# Patient Record
Sex: FEMALE | Race: WHITE | NOT HISPANIC OR LATINO | Employment: FULL TIME | URBAN - METROPOLITAN AREA
[De-identification: names, ages, dates, MRNs, and addresses within clinical notes are randomized per-mention and may not be internally consistent; named-entity substitution may affect disease eponyms.]

---

## 2023-08-09 LAB
ALANINE AMINOTRANSFERASE (SGPT) (U/L) IN SER/PLAS: 10 U/L (ref 7–45)
ALBUMIN (G/DL) IN SER/PLAS: 4.7 G/DL (ref 3.4–5)
ALKALINE PHOSPHATASE (U/L) IN SER/PLAS: 72 U/L (ref 33–110)
ANION GAP IN SER/PLAS: 14 MMOL/L (ref 10–20)
ASPARTATE AMINOTRANSFERASE (SGOT) (U/L) IN SER/PLAS: 15 U/L (ref 9–39)
BASOPHILS (10*3/UL) IN BLOOD BY AUTOMATED COUNT: 0.05 X10E9/L (ref 0–0.1)
BASOPHILS/100 LEUKOCYTES IN BLOOD BY AUTOMATED COUNT: 1 % (ref 0–2)
BILIRUBIN TOTAL (MG/DL) IN SER/PLAS: 0.5 MG/DL (ref 0–1.2)
CALCIDIOL (25 OH VITAMIN D3) (NG/ML) IN SER/PLAS: 74 NG/ML
CALCIUM (MG/DL) IN SER/PLAS: 10.2 MG/DL (ref 8.6–10.6)
CARBON DIOXIDE, TOTAL (MMOL/L) IN SER/PLAS: 29 MMOL/L (ref 21–32)
CHLORIDE (MMOL/L) IN SER/PLAS: 104 MMOL/L (ref 98–107)
CHOLESTEROL (MG/DL) IN SER/PLAS: 228 MG/DL (ref 0–199)
CHOLESTEROL IN HDL (MG/DL) IN SER/PLAS: 73.8 MG/DL
CHOLESTEROL/HDL RATIO: 3.1
CREATININE (MG/DL) IN SER/PLAS: 0.84 MG/DL (ref 0.5–1.05)
EOSINOPHILS (10*3/UL) IN BLOOD BY AUTOMATED COUNT: 0.13 X10E9/L (ref 0–0.7)
EOSINOPHILS/100 LEUKOCYTES IN BLOOD BY AUTOMATED COUNT: 2.5 % (ref 0–6)
ERYTHROCYTE DISTRIBUTION WIDTH (RATIO) BY AUTOMATED COUNT: 13 % (ref 11.5–14.5)
ERYTHROCYTE MEAN CORPUSCULAR HEMOGLOBIN CONCENTRATION (G/DL) BY AUTOMATED: 31.6 G/DL (ref 32–36)
ERYTHROCYTE MEAN CORPUSCULAR VOLUME (FL) BY AUTOMATED COUNT: 94 FL (ref 80–100)
ERYTHROCYTES (10*6/UL) IN BLOOD BY AUTOMATED COUNT: 5 X10E12/L (ref 4–5.2)
ESTIMATED AVERAGE GLUCOSE FOR HBA1C: 114 MG/DL
GFR FEMALE: 81 ML/MIN/1.73M2
GLUCOSE (MG/DL) IN SER/PLAS: 90 MG/DL (ref 74–99)
HEMATOCRIT (%) IN BLOOD BY AUTOMATED COUNT: 47.2 % (ref 36–46)
HEMOGLOBIN (G/DL) IN BLOOD: 14.9 G/DL (ref 12–16)
HEMOGLOBIN A1C/HEMOGLOBIN TOTAL IN BLOOD: 5.6 %
IMMATURE GRANULOCYTES/100 LEUKOCYTES IN BLOOD BY AUTOMATED COUNT: 0.2 % (ref 0–0.9)
LDL: 138 MG/DL (ref 0–99)
LEUKOCYTES (10*3/UL) IN BLOOD BY AUTOMATED COUNT: 5.1 X10E9/L (ref 4.4–11.3)
LYMPHOCYTES (10*3/UL) IN BLOOD BY AUTOMATED COUNT: 2.08 X10E9/L (ref 1.2–4.8)
LYMPHOCYTES/100 LEUKOCYTES IN BLOOD BY AUTOMATED COUNT: 40.8 % (ref 13–44)
MAGNESIUM (MG/DL) IN SER/PLAS: 2.42 MG/DL (ref 1.6–2.4)
MONOCYTES (10*3/UL) IN BLOOD BY AUTOMATED COUNT: 0.46 X10E9/L (ref 0.1–1)
MONOCYTES/100 LEUKOCYTES IN BLOOD BY AUTOMATED COUNT: 9 % (ref 2–10)
NEUTROPHILS (10*3/UL) IN BLOOD BY AUTOMATED COUNT: 2.37 X10E9/L (ref 1.2–7.7)
NEUTROPHILS/100 LEUKOCYTES IN BLOOD BY AUTOMATED COUNT: 46.5 % (ref 40–80)
NRBC (PER 100 WBCS) BY AUTOMATED COUNT: 0 /100 WBC (ref 0–0)
PLATELETS (10*3/UL) IN BLOOD AUTOMATED COUNT: 254 X10E9/L (ref 150–450)
POTASSIUM (MMOL/L) IN SER/PLAS: 4.6 MMOL/L (ref 3.5–5.3)
PROTEIN TOTAL: 7.3 G/DL (ref 6.4–8.2)
SODIUM (MMOL/L) IN SER/PLAS: 142 MMOL/L (ref 136–145)
THYROTROPIN (MIU/L) IN SER/PLAS BY DETECTION LIMIT <= 0.05 MIU/L: 2.37 MIU/L (ref 0.44–3.98)
TRIGLYCERIDE (MG/DL) IN SER/PLAS: 82 MG/DL (ref 0–149)
UREA NITROGEN (MG/DL) IN SER/PLAS: 20 MG/DL (ref 6–23)
VLDL: 16 MG/DL (ref 0–40)

## 2024-01-15 ENCOUNTER — LAB (OUTPATIENT)
Dept: LAB | Facility: LAB | Age: 59
End: 2024-01-15
Payer: COMMERCIAL

## 2024-01-15 DIAGNOSIS — R73.09 OTHER ABNORMAL GLUCOSE: Primary | ICD-10-CM

## 2024-01-15 DIAGNOSIS — E78.2 MIXED HYPERLIPIDEMIA: ICD-10-CM

## 2024-01-15 LAB
CHOLEST SERPL-MCNC: 204 MG/DL (ref 0–199)
CHOLESTEROL/HDL RATIO: 2.8
EST. AVERAGE GLUCOSE BLD GHB EST-MCNC: 117 MG/DL
HBA1C MFR BLD: 5.7 %
HDLC SERPL-MCNC: 73.4 MG/DL
LDLC SERPL CALC-MCNC: 107 MG/DL
NON HDL CHOLESTEROL: 131 MG/DL (ref 0–149)
TRIGL SERPL-MCNC: 116 MG/DL (ref 0–149)
VLDL: 23 MG/DL (ref 0–40)

## 2024-01-15 PROCEDURE — 80061 LIPID PANEL: CPT

## 2024-01-15 PROCEDURE — 83036 HEMOGLOBIN GLYCOSYLATED A1C: CPT

## 2024-01-15 PROCEDURE — 36415 COLL VENOUS BLD VENIPUNCTURE: CPT

## 2024-01-25 ENCOUNTER — APPOINTMENT (OUTPATIENT)
Dept: INTEGRATIVE MEDICINE | Facility: CLINIC | Age: 59
End: 2024-01-25
Payer: COMMERCIAL

## 2024-06-05 ENCOUNTER — OFFICE VISIT (OUTPATIENT)
Dept: DERMATOLOGY | Facility: CLINIC | Age: 59
End: 2024-06-05
Payer: COMMERCIAL

## 2024-06-05 DIAGNOSIS — Z80.8 FAMILY HISTORY OF SKIN CANCER: ICD-10-CM

## 2024-06-05 DIAGNOSIS — L82.1 SEBORRHEIC KERATOSES: ICD-10-CM

## 2024-06-05 DIAGNOSIS — D22.9 MULTIPLE BENIGN MELANOCYTIC NEVI: ICD-10-CM

## 2024-06-05 DIAGNOSIS — Z12.83 ENCOUNTER FOR SCREENING FOR MALIGNANT NEOPLASM OF SKIN: ICD-10-CM

## 2024-06-05 DIAGNOSIS — L90.5 SCAR CONDITIONS AND FIBROSIS OF SKIN: ICD-10-CM

## 2024-06-05 DIAGNOSIS — D18.01 CHERRY ANGIOMA: ICD-10-CM

## 2024-06-05 DIAGNOSIS — L57.0 ACTINIC KERATOSIS: Primary | ICD-10-CM

## 2024-06-05 DIAGNOSIS — L57.8 SUN-DAMAGED SKIN: ICD-10-CM

## 2024-06-05 PROBLEM — Z85.828 PERSONAL HISTORY OF OTHER MALIGNANT NEOPLASM OF SKIN: Status: ACTIVE | Noted: 2024-06-05

## 2024-06-05 PROCEDURE — 17000 DESTRUCT PREMALG LESION: CPT | Performed by: DERMATOLOGY

## 2024-06-05 PROCEDURE — 99213 OFFICE O/P EST LOW 20 MIN: CPT | Performed by: DERMATOLOGY

## 2024-06-05 PROCEDURE — 1036F TOBACCO NON-USER: CPT | Performed by: DERMATOLOGY

## 2024-06-05 RX ORDER — SIMVASTATIN 80 MG/1
TABLET, FILM COATED ORAL
COMMUNITY

## 2024-06-05 ASSESSMENT — PATIENT GLOBAL ASSESSMENT (PGA): PATIENT GLOBAL ASSESSMENT: PATIENT GLOBAL ASSESSMENT:  1 - CLEAR

## 2024-06-05 ASSESSMENT — DERMATOLOGY PATIENT ASSESSMENT
ARE YOU ON BIRTH CONTROL: NO
HAVE YOU HAD OR DO YOU HAVE VASCULAR DISEASE: NO
ARE YOU AN ORGAN TRANSPLANT RECIPIENT: NO
DO YOU USE A TANNING BED: NO
DO YOU USE SUNSCREEN: OCCASIONALLY
HAVE YOU HAD OR DO YOU HAVE A STAPH INFECTION: NO
ARE YOU TRYING TO GET PREGNANT: NO
DO YOU HAVE ANY NEW OR CHANGING LESIONS: YES
DO YOU HAVE IRREGULAR MENSTRUAL CYCLES: NO

## 2024-06-05 ASSESSMENT — DERMATOLOGY QUALITY OF LIFE (QOL) ASSESSMENT
ARE THERE EXCLUSIONS OR EXCEPTIONS FOR THE QUALITY OF LIFE ASSESSMENT: NO
RATE HOW BOTHERED YOU ARE BY EFFECTS OF YOUR SKIN PROBLEMS ON YOUR ACTIVITIES (EG, GOING OUT, ACCOMPLISHING WHAT YOU WANT, WORK ACTIVITIES OR YOUR RELATIONSHIPS WITH OTHERS): 0 - NEVER BOTHERED
RATE HOW BOTHERED YOU ARE BY SYMPTOMS OF YOUR SKIN PROBLEM (EG, ITCHING, STINGING BURNING, HURTING OR SKIN IRRITATION): 0 - NEVER BOTHERED
RATE HOW EMOTIONALLY BOTHERED YOU ARE BY YOUR SKIN PROBLEM (FOR EXAMPLE, WORRY, EMBARRASSMENT, FRUSTRATION): 0 - NEVER BOTHERED
DATE THE QUALITY-OF-LIFE ASSESSMENT WAS COMPLETED: 66996

## 2024-06-05 ASSESSMENT — ITCH NUMERIC RATING SCALE: HOW SEVERE IS YOUR ITCHING?: 0

## 2024-06-05 NOTE — PROGRESS NOTES
Subjective     Charlene Gu is a 59 y.o. female who presents for the following: Skin Check (Tip of nose very dry. Has used diana oil.  ).   LOV 5/2023 for FBSE    Skin Cancer History  H/o basal cell carcinoma on face      Review of Systems:  No other skin or systemic complaints other than what is documented elsewhere in the note.    The following portions of the chart were reviewed this encounter and updated as appropriate:       Specialty Problems          Dermatology Problems    Personal history of other malignant neoplasm of skin     -Moderate Dysplastic Nevus. Year Diagnosed: 2022. June. Location: Right Calf. Treatment(s): margins clear, no tx needed. Pathology: [id number]   -Basal Cell Carcinoma. Year Diagnosed: 2006 Location: Right Cheek. S/p excision           Past Medical History:  Charlene Gu  has no past medical history on file.    Past Surgical History:  Charlene Gu  has no past surgical history on file.    Family History:  Patient family history is not on file.    Social History:  Charlene Gu  reports that she has never smoked. She has never used smokeless tobacco. No history on file for alcohol use and drug use.    Allergies:  Patient has no known allergies.    Current Medications / CAM's:    Current Outpatient Medications:     simvastatin (Zocor) 80 mg tablet, Take by mouth., Disp: , Rfl:      Objective   Well appearing patient in no apparent distress; mood and affect are within normal limits.    A full examination was performed including scalp, head, eyes, ears, nose, lips, neck, chest, axillae, abdomen, back, buttocks, bilateral upper extremities, bilateral lower extremities, hands, feet, fingers, toes, fingernails, and toenails. All findings within normal limits unless otherwise noted below. Patient declined genital and gluteal cleft exam.  Patient has toenail polish in place.  - scattered regular brown macules and papules    - Scattered waxy tan/grey/brown papules with horn  cysts    - scattered small bright red papules and macules    - Well healed scar at prior treatment sites without visual or palpable evidence of recurrence.     - scattered tan macules, telangiectasias, and general photo-damage    Nose  Erythematous macules with gritty scale.         Assessment/Plan   Actinic keratosis  Nose    - The premalignant nature of the disorder was reviewed and treatment options were reviewed.   - Patient agreeable to treatment with cryotherapy today.  Sites confirmed. Risks and benefits reviewed including but not limited to pain, redness, swelling, blister, scab, healing with hypo or hyperpigmentation, and scar. Chance of recurrence or persistence reviewed.     Destr of lesion - Nose  Complexity: simple    Destruction method: cryotherapy    Informed consent: discussed and consent obtained    Lesion destroyed using liquid nitrogen: Yes    Cryotherapy cycles:  1  Outcome: patient tolerated procedure well with no complications    Post-procedure details: wound care instructions given      Encounter for screening for malignant neoplasm of skin    Related Procedures  Follow Up In Dermatology - Established Patient    Multiple benign melanocytic nevi    Benign melanocytic nevi  - Discussed benign nature and that no treatment is necessary unless it becomes painful or increases in size. Patient opts for clinical monitoring at this time.    - Sun protective behavior reviewed and encouraged including the use of over-the-counter sunscreen with SPF30+ daily (reapply every 1.5 hours when outdoors), UPF clothing, broad rimmed hats, sunglasses, and avoidance of midday sun. Home skin monitoring encouraged and how to monitor for skin cancer (changing or new moles, new rapidly growing or non-healing lesions) reviewed. Patient encouraged to call with interval concerns or changes.      Seborrheic keratoses    Seborrheic keratosis (-es)  - Discussed benign nature and that no treatment is necessary unless it  becomes painful or increases in size. Patient opts for clinical monitoring at this time.      Cherry angioma    Cherry angioma(s)  - Discussed benign nature and that no treatment is necessary unless it becomes painful or increases in size. Patient opts for clinical monitoring at this time.      Scar conditions and fibrosis of skin    - Well healed scar(s) at sites of prior skin cancer and/or dysplastic nevi. - basal cell carcinoma face and mod dysplastic nevus right calf  - Sun protective behavior reviewed and encouraged including the use of over-the-counter sunscreen with SPF30+ daily (reapply every 1.5 hours when outdoors), UPF clothing, broad rimmed hats, sunglasses, and avoidance of midday sun. Home skin monitoring encouraged and how to monitor for skin cancer (changing or new moles, new rapidly growing or non-healing lesions) reviewed. Patient encouraged to call with interval concerns or changes.       Sun-damaged skin    Actinically damaged skin-  - Sun protective behavior reviewed and encouraged including the use of over-the-counter sunscreen with SPF30+ daily (reapply every 1.5 hours when outdoors), UPF clothing, broad rimmed hats, sunglasses, and avoidance of midday sun. Home skin monitoring encouraged and how to monitor for skin cancer (changing or new moles, new rapidly growing or non-healing lesions) reviewed. Patient encouraged to call with interval concerns or changes.      Family history of skin cancer       Fuv 1 year fbse  Sherrill Aguilera MD

## 2024-06-21 ENCOUNTER — ALLIED HEALTH (OUTPATIENT)
Dept: INTEGRATIVE MEDICINE | Facility: CLINIC | Age: 59
End: 2024-06-21
Payer: COMMERCIAL

## 2024-06-21 DIAGNOSIS — M54.50 CHRONIC LOW BACK PAIN WITHOUT SCIATICA, UNSPECIFIED BACK PAIN LATERALITY: ICD-10-CM

## 2024-06-21 DIAGNOSIS — M99.06 SEGMENTAL AND SOMATIC DYSFUNCTION OF LOWER EXTREMITY: ICD-10-CM

## 2024-06-21 DIAGNOSIS — M53.3 SACRAL BACK PAIN: ICD-10-CM

## 2024-06-21 DIAGNOSIS — M99.04 SOMATIC DYSFUNCTION OF SACRAL REGION: ICD-10-CM

## 2024-06-21 DIAGNOSIS — M99.03 SOMATIC DYSFUNCTION OF LUMBAR REGION: Primary | ICD-10-CM

## 2024-06-21 DIAGNOSIS — M25.552 PAIN IN LEFT HIP: ICD-10-CM

## 2024-06-21 DIAGNOSIS — M99.05 SOMATIC DYSFUNCTION OF PELVIS REGION: ICD-10-CM

## 2024-06-21 DIAGNOSIS — G89.29 CHRONIC LOW BACK PAIN WITHOUT SCIATICA, UNSPECIFIED BACK PAIN LATERALITY: ICD-10-CM

## 2024-06-21 DIAGNOSIS — M79.10 MYALGIA, UNSPECIFIED SITE: ICD-10-CM

## 2024-06-21 PROCEDURE — 99203 OFFICE O/P NEW LOW 30 MIN: CPT | Performed by: CHIROPRACTOR

## 2024-06-21 PROCEDURE — 98941 CHIROPRACT MANJ 3-4 REGIONS: CPT | Performed by: CHIROPRACTOR

## 2024-06-21 ASSESSMENT — ENCOUNTER SYMPTOMS
TROUBLE SWALLOWING: 0
FATIGUE: 0
CONSTIPATION: 0
CHEST TIGHTNESS: 0
BACK PAIN: 1
FREQUENCY: 0
JOINT SWELLING: 0
CONFUSION: 0
ABDOMINAL PAIN: 0
DIARRHEA: 0
FEVER: 0

## 2024-06-21 NOTE — PROGRESS NOTES
Subjective   Patient ID: Charlene Gu is a 59 y.o. female who presents today for back pain.  Referred by:     Please note: Voice to text software was used when completing this note. While the note was proofread, portions may include grammatical errors. Please contact me with any questions/concerns as it relates to these types of errors.     1/20 [facility]    Assessment/Plan   The patient's initial presentation is consistent with the following differential diagnoses: Segmental and somatic dysfunction of the lumbar, sacral and pelvic regions, lumbar spondylitic changes as well as postural strain secondary to scoliosis.  I do not identify any contraindications to care.  Treatment will include joint manipulation to tolerance and various myofascial techniques such as dry needling.  We will monitor her response to care to determine if any changes need to occur or if imaging is required.    HPI - 06/21/2024: Patient is presenting today for initial evaluation of lower back and left hip pain.  Symptoms are mostly on the left side and mostly describes the symptoms as stiff and sore.  She denies focal radicular symptoms but does experience referral to the left hip.  Symptoms are at their worst in the evening when attempting to sleep as well as first thing in morning.  In general movement is better than being stationary.  She reports that she will sit on a doughnut at work which helps her get through her workday.  Previous chiropractic care has been helpful.  She informs me of being diagnosed with idiopathic scoliosis at the age 13 and has been diligent with home exercises to help improve/prevent worsening of this curve.  She also informs me of being diagnosed in the past with lumbar stenosis.    Other active problems do include skin cancer.    Relevant Imaging:    Review of Systems   Constitutional:  Negative for fatigue and fever.   HENT:  Negative for trouble swallowing.    Eyes:  Negative for visual disturbance.    Respiratory:  Negative for chest tightness.    Cardiovascular:  Negative for chest pain and leg swelling.   Gastrointestinal:  Negative for abdominal pain, constipation and diarrhea.   Genitourinary:  Negative for frequency.   Musculoskeletal:  Positive for back pain. Negative for joint swelling.   Neurological:  Negative for syncope.   Psychiatric/Behavioral:  Negative for confusion.    All other systems reviewed and are negative.      Objective     The patient is alert and oriented x 3. Cranial nerves II-XII are grossly intact. Mild difficulty with transitional movements is observed.  Decreased lumbar lordosis. Lowered left iliac crest.  Leg length is symmetric.  No deficits observed when analyzing gait.  No scarring is present.  No evidence of muscle wasting.    Moderate to severe hypertonicity and tenderness is present in the thoracic paraspinals, lumbar paraspinals, quadratus lumborum, upper gluteals, piriformis, hamstrings.  Findings are greater on the left.    Segmental joint dysfunction and asymmetry was assessed with motion and static palpation and is identified in the following areas:  Cervical:   Thoracic:   Lumbopelvic: L4/5, L5/S1, Counter-nutation of Left Sacral Base, Right SI, Left SI   Extremities: L femoracetabular joint.    Range of Motion Testing: Lumbar spine extension and left sidebending are reduced, with pain reported during left sidebending.    Myotomes/Strength Testing: Single-leg standing is normal.  Patient performs two thirds of a squat without pain.  Toe walk is normal.  Heel walk is normal.    Reflexes: Lower extremity reflexes are diffusely graded 2+.  Steiner's reflex is negative.    Special Tests/Orthopedics: Slump test is negative bilaterally.  Nachlas test is provocative when performed on the right.  Yeomans test is provocative on the left.    Today's treatment:  Drop-table manipulation applied to the: left > right SI joint.  Diversified manipulation applied to the involved  lumbar and sacral segments.     Extremity manipulation applied to the: LAD applied to B hips.    Patient declines IDN today.    Response to today's treatment: soreness reported.    Treatment Plan:   The patient and I discussed the risks and benefits of chiropractic care. Based on the patient's subjective complaints along with the examination findings, it is advised that a course of chiropractic treatment be initiated.   The goals of care include: reduce pain levels, improve mobility and functional status.  The patient tolerated today's treatment with little or no additional discomfort and was instructed to contact the office for questions or concerns. They were advised on the potential for post-treatment soreness.   Frequency:  1x/week for 5 visits (1/5)  We will closely monitor their response to care to determine if any changes need to occur, if advanced imaging is needed, or if referral to other providers is appropriate.

## 2024-06-28 ENCOUNTER — APPOINTMENT (OUTPATIENT)
Dept: INTEGRATIVE MEDICINE | Facility: CLINIC | Age: 59
End: 2024-06-28
Payer: COMMERCIAL

## 2024-06-28 DIAGNOSIS — M99.03 SOMATIC DYSFUNCTION OF LUMBAR REGION: Primary | ICD-10-CM

## 2024-06-28 DIAGNOSIS — M25.552 PAIN IN LEFT HIP: ICD-10-CM

## 2024-06-28 DIAGNOSIS — M54.50 CHRONIC LOW BACK PAIN WITHOUT SCIATICA, UNSPECIFIED BACK PAIN LATERALITY: ICD-10-CM

## 2024-06-28 DIAGNOSIS — G89.29 CHRONIC LOW BACK PAIN WITHOUT SCIATICA, UNSPECIFIED BACK PAIN LATERALITY: ICD-10-CM

## 2024-06-28 DIAGNOSIS — M99.05 SOMATIC DYSFUNCTION OF PELVIS REGION: ICD-10-CM

## 2024-06-28 DIAGNOSIS — M99.04 SOMATIC DYSFUNCTION OF SACRAL REGION: ICD-10-CM

## 2024-06-28 DIAGNOSIS — M53.3 SACRAL BACK PAIN: ICD-10-CM

## 2024-06-28 DIAGNOSIS — M99.06 SEGMENTAL AND SOMATIC DYSFUNCTION OF LOWER EXTREMITY: ICD-10-CM

## 2024-06-28 PROCEDURE — 98943 CHIROPRACT MANJ XTRSPINL 1/>: CPT | Performed by: CHIROPRACTOR

## 2024-06-28 PROCEDURE — 98941 CHIROPRACT MANJ 3-4 REGIONS: CPT | Performed by: CHIROPRACTOR

## 2024-06-28 ASSESSMENT — ENCOUNTER SYMPTOMS
FREQUENCY: 0
BACK PAIN: 1
TROUBLE SWALLOWING: 0
DIARRHEA: 0
CONSTIPATION: 0
FEVER: 0
JOINT SWELLING: 0
CONFUSION: 0
ABDOMINAL PAIN: 0
CHEST TIGHTNESS: 0
FATIGUE: 0

## 2024-06-28 NOTE — PROGRESS NOTES
Subjective   Patient ID: Charlene Gu is a 59 y.o. female who presents today for low back pain.    2/20 [facility]    Today, the patient rates their degree of pain as a 5 out of 10 on the numeric pain rating scale.    HPI -the patient reports mild relief from initial visit.  She denies any soreness from initial visit.  She continues to feel stiff, especially in the morning with difficulty bending forward.    06/21/2024: Patient is presenting today for initial evaluation of lower back and left hip pain.  Symptoms are mostly on the left side and mostly describes the symptoms as stiff and sore.  She denies focal radicular symptoms but does experience referral to the left hip.  Symptoms are at their worst in the evening when attempting to sleep as well as first thing in morning.  In general movement is better than being stationary.  She reports that she will sit on a doughnut at work which helps her get through her workday.  Previous chiropractic care has been helpful.  She informs me of being diagnosed with idiopathic scoliosis at the age 13 and has been diligent with home exercises to help improve/prevent worsening of this curve.  She also informs me of being diagnosed in the past with lumbar stenosis.    Other active problems do include skin cancer.    Review of Systems   Constitutional:  Negative for fatigue and fever.   HENT:  Negative for trouble swallowing.    Eyes:  Negative for visual disturbance.   Respiratory:  Negative for chest tightness.    Cardiovascular:  Negative for chest pain and leg swelling.   Gastrointestinal:  Negative for abdominal pain, constipation and diarrhea.   Genitourinary:  Negative for frequency.   Musculoskeletal:  Positive for back pain. Negative for joint swelling.   Neurological:  Negative for syncope.   Psychiatric/Behavioral:  Negative for confusion.    All other systems reviewed and are negative.      Relevant Imaging:    Objective     Physical Exam    Spine Musculoskeletal  Exam    The patient is alert and oriented x 3. Cranial nerves II-XII are grossly intact. Mild difficulty with transitional movements is observed.  Decreased lumbar lordosis. Lowered left iliac crest.  Leg length is symmetric.  No deficits observed when analyzing gait.  No scarring is present.  No evidence of muscle wasting.    Moderate to severe hypertonicity and tenderness is present in the thoracic paraspinals, lumbar paraspinals, quadratus lumborum, upper gluteals, piriformis, hamstrings.  Findings are greater on the left.    Segmental joint dysfunction and asymmetry was assessed with motion and static palpation and is identified in the following areas:  Cervical:   Thoracic:   Lumbopelvic: L4/5, L5/S1, Counter-nutation of Left Sacral Base, Right SI, Left SI   Extremities: L femoracetabular joint.  -----------------------------------------------------------------------------------------------------------------------------------  Recall from initial visit:    Range of Motion Testing: Lumbar spine extension and left sidebending are reduced, with pain reported during left sidebending.    Myotomes/Strength Testing: Single-leg standing is normal.  Patient performs two thirds of a squat without pain.  Toe walk is normal.  Heel walk is normal.    Reflexes: Lower extremity reflexes are diffusely graded 2+.  Steiner's reflex is negative.    Special Tests/Orthopedics: Slump test is negative bilaterally.  Nachlas test is provocative when performed on the right.  Yeomans test is provocative on the left.    Today's treatment:  Drop-table manipulation applied to the: left SI joint.  Diversified manipulation applied to the involved lumbar and sacral segments.     Extremity manipulation applied to the: LAD applied to B hips, L>R.    Manual stretching applied to hip musculature.    Response to today's treatment: soreness reported.    Assessment/Plan   See diagnoses.  Fair response to initial visit.    (The patient's initial  presentation is consistent with the following differential diagnoses: Segmental and somatic dysfunction of the lumbar, sacral and pelvic regions, lumbar spondylitic changes as well as postural strain secondary to scoliosis.  I do not identify any contraindications to care.  Treatment will include joint manipulation to tolerance and various myofascial techniques such as dry needling.  We will monitor her response to care to determine if any changes need to occur or if imaging is required.)    Treatment Plan:   The goals of care include: reduce pain levels, improve mobility and functional status.  The patient tolerated today's treatment with little or no additional discomfort and was instructed to contact the office for questions or concerns. They were advised on the potential for post-treatment soreness.   Frequency:  1x/week for 5 visits (2/5)  We will closely monitor their response to care to determine if any changes need to occur, if advanced imaging is needed, or if referral to other providers is appropriate.

## 2024-07-08 ENCOUNTER — APPOINTMENT (OUTPATIENT)
Dept: INTEGRATIVE MEDICINE | Facility: CLINIC | Age: 59
End: 2024-07-08
Payer: COMMERCIAL

## 2024-07-08 DIAGNOSIS — M99.04 SOMATIC DYSFUNCTION OF SACRAL REGION: ICD-10-CM

## 2024-07-08 DIAGNOSIS — M53.3 SACRAL BACK PAIN: ICD-10-CM

## 2024-07-08 DIAGNOSIS — M99.06 SEGMENTAL AND SOMATIC DYSFUNCTION OF LOWER EXTREMITY: ICD-10-CM

## 2024-07-08 DIAGNOSIS — M99.03 SOMATIC DYSFUNCTION OF LUMBAR REGION: Primary | ICD-10-CM

## 2024-07-08 DIAGNOSIS — M99.05 SOMATIC DYSFUNCTION OF PELVIS REGION: ICD-10-CM

## 2024-07-08 DIAGNOSIS — M54.50 CHRONIC LOW BACK PAIN WITHOUT SCIATICA, UNSPECIFIED BACK PAIN LATERALITY: ICD-10-CM

## 2024-07-08 DIAGNOSIS — M25.552 PAIN IN LEFT HIP: ICD-10-CM

## 2024-07-08 DIAGNOSIS — G89.29 CHRONIC LOW BACK PAIN WITHOUT SCIATICA, UNSPECIFIED BACK PAIN LATERALITY: ICD-10-CM

## 2024-07-08 PROCEDURE — 98941 CHIROPRACT MANJ 3-4 REGIONS: CPT | Performed by: CHIROPRACTOR

## 2024-07-08 PROCEDURE — 98943 CHIROPRACT MANJ XTRSPINL 1/>: CPT | Performed by: CHIROPRACTOR

## 2024-07-08 ASSESSMENT — ENCOUNTER SYMPTOMS
FEVER: 0
DIARRHEA: 0
JOINT SWELLING: 0
FATIGUE: 0
BACK PAIN: 1
ABDOMINAL PAIN: 0
CHEST TIGHTNESS: 0
FREQUENCY: 0
TROUBLE SWALLOWING: 0
CONSTIPATION: 0
CONFUSION: 0

## 2024-07-08 NOTE — PROGRESS NOTES
Subjective   Patient ID: Charlene Gu is a 59 y.o. female who presents today for low back pain.    3/20 [facility]    HPI -negative response to last visit. She reports feeling very sore in the lumbosacral region on the left.  Continued radiation to the left hip.  Forward bending remains difficult.  Symptoms remain worse in the morning.    (06/21/2024: Patient is presenting today for initial evaluation of lower back and left hip pain.  Symptoms are mostly on the left side and mostly describes the symptoms as stiff and sore.  She denies focal radicular symptoms but does experience referral to the left hip.  Symptoms are at their worst in the evening when attempting to sleep as well as first thing in morning.  In general movement is better than being stationary.  She reports that she will sit on a doughnut at work which helps her get through her workday.  Previous chiropractic care has been helpful.  She informs me of being diagnosed with idiopathic scoliosis at the age 13 and has been diligent with home exercises to help improve/prevent worsening of this curve.  She also informs me of being diagnosed in the past with lumbar stenosis.    Other active problems do include skin cancer.)    Review of Systems   Constitutional:  Negative for fatigue and fever.   HENT:  Negative for trouble swallowing.    Eyes:  Negative for visual disturbance.   Respiratory:  Negative for chest tightness.    Cardiovascular:  Negative for chest pain and leg swelling.   Gastrointestinal:  Negative for abdominal pain, constipation and diarrhea.   Genitourinary:  Negative for frequency.   Musculoskeletal:  Positive for back pain. Negative for joint swelling.   Neurological:  Negative for syncope.   Psychiatric/Behavioral:  Negative for confusion.    All other systems reviewed and are negative.      Relevant Imaging:    Objective     The patient is alert and oriented x 3. Cranial nerves II-XII are grossly intact. Mild difficulty with transitional  movements is observed.  Decreased lumbar lordosis. Lowered left iliac crest.  Leg length is symmetric.  No deficits observed when analyzing gait.  No scarring is present.  No evidence of muscle wasting.    Moderate to severe hypertonicity and tenderness is present in the thoracic paraspinals, lumbar paraspinals, quadratus lumborum, upper gluteals, piriformis, hamstrings.  Findings are greater on the left.    Segmental joint dysfunction and asymmetry was assessed with motion and static palpation and is identified in the following areas:  Cervical:   Thoracic:   Lumbopelvic: L4/5, L5/S1, Counter-nutation of Left Sacral Base, Right SI, Left SI.  PI ilium on left.  Extremities: L femoracetabular joint.  -----------------------------------------------------------------------------------------------------------------------------------  Recall from initial visit:    Range of Motion Testing: Lumbar spine extension and left sidebending are reduced, with pain reported during left sidebending.    Myotomes/Strength Testing: Single-leg standing is normal.  Patient performs two thirds of a squat without pain.  Toe walk is normal.  Heel walk is normal.    Reflexes: Lower extremity reflexes are diffusely graded 2+.  Steiner's reflex is negative.    Special Tests/Orthopedics: Slump test is negative bilaterally.  Nachlas test is provocative when performed on the right.  Yeomans test is provocative on the left.    Today's treatment:  Drop-table manipulation applied to the: left SI joint.  Pelvic blocking applied to right SI joint.  Flexion-distraction applied to lumbar spine.   Extremity manipulation applied to the: LAD applied to B hips, L>R.    Graston technique applied to right lumbar paraspinals for 5 minutes.  Advise patient on lateral gluteal strengthening.    Assessment/Plan   See diagnoses.  Fair response to initial visit.    (The patient's initial presentation is consistent with the following differential diagnoses: Segmental  and somatic dysfunction of the lumbar, sacral and pelvic regions, lumbar spondylitic changes as well as postural strain secondary to scoliosis.  I do not identify any contraindications to care.  Treatment will include joint manipulation to tolerance and various myofascial techniques such as dry needling.  We will monitor her response to care to determine if any changes need to occur or if imaging is required.)    Treatment Plan:   The goals of care include: reduce pain levels, improve mobility and functional status.  The patient tolerated today's treatment with little or no additional discomfort and was instructed to contact the office for questions or concerns. They were advised on the potential for post-treatment soreness.   Frequency:  1x/week for 5 visits (3/5)  We will closely monitor their response to care to determine if any changes need to occur, if advanced imaging is needed, or if referral to other providers is appropriate.

## 2024-07-19 ENCOUNTER — APPOINTMENT (OUTPATIENT)
Dept: INTEGRATIVE MEDICINE | Facility: CLINIC | Age: 59
End: 2024-07-19
Payer: COMMERCIAL

## 2024-07-19 DIAGNOSIS — M99.06 SEGMENTAL AND SOMATIC DYSFUNCTION OF LOWER EXTREMITY: ICD-10-CM

## 2024-07-19 DIAGNOSIS — G89.29 CHRONIC LOW BACK PAIN WITHOUT SCIATICA, UNSPECIFIED BACK PAIN LATERALITY: ICD-10-CM

## 2024-07-19 DIAGNOSIS — M99.03 SOMATIC DYSFUNCTION OF LUMBAR REGION: Primary | ICD-10-CM

## 2024-07-19 DIAGNOSIS — M99.04 SOMATIC DYSFUNCTION OF SACRAL REGION: ICD-10-CM

## 2024-07-19 DIAGNOSIS — M54.50 CHRONIC LOW BACK PAIN WITHOUT SCIATICA, UNSPECIFIED BACK PAIN LATERALITY: ICD-10-CM

## 2024-07-19 DIAGNOSIS — M99.05 SOMATIC DYSFUNCTION OF PELVIS REGION: ICD-10-CM

## 2024-07-19 DIAGNOSIS — M25.552 PAIN IN LEFT HIP: ICD-10-CM

## 2024-07-19 DIAGNOSIS — M53.3 SACRAL BACK PAIN: ICD-10-CM

## 2024-07-19 PROCEDURE — 98941 CHIROPRACT MANJ 3-4 REGIONS: CPT | Performed by: CHIROPRACTOR

## 2024-07-19 ASSESSMENT — ENCOUNTER SYMPTOMS
FEVER: 0
CHEST TIGHTNESS: 0
TROUBLE SWALLOWING: 0
CONSTIPATION: 0
BACK PAIN: 1
CONFUSION: 0
JOINT SWELLING: 0
FREQUENCY: 0
FATIGUE: 0
DIARRHEA: 0
ABDOMINAL PAIN: 0

## 2024-07-19 NOTE — PROGRESS NOTES
Subjective   Patient ID: Charlene Gu is a 59 y.o. female who presents today for low back pain.    4/20 [facility]    HPI -she had about two days of relief from last visit, then symptoms returned.  She continues to be very stiff and sore in the left gluteal region.  Sleeping is very difficult due to her pain levels. No significant change in her condition since starting care.     (06/21/2024: Patient is presenting today for initial evaluation of lower back and left hip pain.  Symptoms are mostly on the left side and mostly describes the symptoms as stiff and sore.  She denies focal radicular symptoms but does experience referral to the left hip.  Symptoms are at their worst in the evening when attempting to sleep as well as first thing in morning.  In general movement is better than being stationary.  She reports that she will sit on a doughnut at work which helps her get through her workday.  Previous chiropractic care has been helpful.  She informs me of being diagnosed with idiopathic scoliosis at the age 13 and has been diligent with home exercises to help improve/prevent worsening of this curve.  She also informs me of being diagnosed in the past with lumbar stenosis.    Other active problems do include skin cancer.)    Review of Systems   Constitutional:  Negative for fatigue and fever.   HENT:  Negative for trouble swallowing.    Eyes:  Negative for visual disturbance.   Respiratory:  Negative for chest tightness.    Cardiovascular:  Negative for chest pain and leg swelling.   Gastrointestinal:  Negative for abdominal pain, constipation and diarrhea.   Genitourinary:  Negative for frequency.   Musculoskeletal:  Positive for back pain. Negative for joint swelling.   Neurological:  Negative for syncope.   Psychiatric/Behavioral:  Negative for confusion.    All other systems reviewed and are negative.      Relevant Imaging:    Objective     The patient is alert and oriented x 3. Cranial nerves II-XII are grossly  intact. Mild difficulty with transitional movements is observed.  Decreased lumbar lordosis. Lowered left iliac crest.  Leg length is symmetric.  No deficits observed when analyzing gait.  No scarring is present.  No evidence of muscle wasting.    Moderate to severe hypertonicity and tenderness is present in the thoracic paraspinals, lumbar paraspinals, quadratus lumborum, upper gluteals, piriformis, hamstrings.  Findings are greater on the left.    Segmental joint dysfunction and asymmetry was assessed with motion and static palpation and is identified in the following areas:  Cervical:   Thoracic:   Lumbopelvic: L4/5, L5/S1, Counter-nutation of Left Sacral Base, Right SI, Left SI.  PI ilium on left.  Extremities: L femoracetabular joint.  -----------------------------------------------------------------------------------------------------------------------------------  Recall from initial visit:    Range of Motion Testing: Lumbar spine extension and left sidebending are reduced, with pain reported during left sidebending.    Myotomes/Strength Testing: Single-leg standing is normal.  Patient performs two thirds of a squat without pain.  Toe walk is normal.  Heel walk is normal.    Reflexes: Lower extremity reflexes are diffusely graded 2+.  Steiner's reflex is negative.    Special Tests/Orthopedics: Slump test is negative bilaterally.  Nachlas test is provocative when performed on the right.  Yeomans test is provocative on the left.    Today's treatment:  Drop-table manipulation applied to the: left SI joint.  Pelvic blocking applied to right SI joint.  Flexion-distraction applied to lumbar spine.   Extremity manipulation applied to the:     Graston technique applied to left lumbar paraspinals and gluteals for 5 minutes.    (Advise patient on lateral gluteal strengthening.)    Assessment/Plan   See diagnoses.  Based on presentation, I am ordering pelvis x-rays.     (The patient's initial presentation is consistent  with the following differential diagnoses: Segmental and somatic dysfunction of the lumbar, sacral and pelvic regions, lumbar spondylitic changes as well as postural strain secondary to scoliosis.  I do not identify any contraindications to care.  Treatment will include joint manipulation to tolerance and various myofascial techniques such as dry needling.  We will monitor her response to care to determine if any changes need to occur or if imaging is required.)    Treatment Plan:   The goals of care include: reduce pain levels, improve mobility and functional status.  The patient tolerated today's treatment with little or no additional discomfort and was instructed to contact the office for questions or concerns. They were advised on the potential for post-treatment soreness.   Frequency:  1x/week for 5 visits (4/5)  We will closely monitor their response to care to determine if any changes need to occur, if advanced imaging is needed, or if referral to other providers is appropriate.

## 2024-07-24 ENCOUNTER — HOSPITAL ENCOUNTER (OUTPATIENT)
Dept: RADIOLOGY | Facility: CLINIC | Age: 59
Discharge: HOME | End: 2024-07-24
Payer: COMMERCIAL

## 2024-07-24 DIAGNOSIS — M25.552 PAIN IN LEFT HIP: ICD-10-CM

## 2024-07-24 PROCEDURE — 72170 X-RAY EXAM OF PELVIS: CPT | Performed by: STUDENT IN AN ORGANIZED HEALTH CARE EDUCATION/TRAINING PROGRAM

## 2024-07-24 PROCEDURE — 72170 X-RAY EXAM OF PELVIS: CPT

## 2024-07-26 ENCOUNTER — APPOINTMENT (OUTPATIENT)
Dept: INTEGRATIVE MEDICINE | Facility: CLINIC | Age: 59
End: 2024-07-26
Payer: COMMERCIAL

## 2024-07-26 DIAGNOSIS — M25.552 PAIN IN LEFT HIP: ICD-10-CM

## 2024-07-26 DIAGNOSIS — M99.03 SOMATIC DYSFUNCTION OF LUMBAR REGION: Primary | ICD-10-CM

## 2024-07-26 DIAGNOSIS — G89.29 CHRONIC LOW BACK PAIN WITHOUT SCIATICA, UNSPECIFIED BACK PAIN LATERALITY: ICD-10-CM

## 2024-07-26 DIAGNOSIS — M53.3 SACRAL BACK PAIN: ICD-10-CM

## 2024-07-26 DIAGNOSIS — M99.04 SOMATIC DYSFUNCTION OF SACRAL REGION: ICD-10-CM

## 2024-07-26 DIAGNOSIS — M54.50 CHRONIC LOW BACK PAIN WITHOUT SCIATICA, UNSPECIFIED BACK PAIN LATERALITY: ICD-10-CM

## 2024-07-26 DIAGNOSIS — M99.05 SOMATIC DYSFUNCTION OF PELVIS REGION: ICD-10-CM

## 2024-07-26 PROCEDURE — 98941 CHIROPRACT MANJ 3-4 REGIONS: CPT | Performed by: CHIROPRACTOR

## 2024-07-26 ASSESSMENT — ENCOUNTER SYMPTOMS
JOINT SWELLING: 0
CHEST TIGHTNESS: 0
FREQUENCY: 0
FATIGUE: 0
ABDOMINAL PAIN: 0
CONSTIPATION: 0
FEVER: 0
TROUBLE SWALLOWING: 0
DIARRHEA: 0
CONFUSION: 0
BACK PAIN: 1

## 2024-07-26 NOTE — PROGRESS NOTES
Subjective   Patient ID: Charlene Gu is a 59 y.o. female who presents today for low back pain.    5/20 [facility]    HPI -the patient reports some steady improvement over the last couple of visits.  There is less discomfort in the hip.  Lying down/sleeping has improved.    (06/21/2024: Patient is presenting today for initial evaluation of lower back and left hip pain.  Symptoms are mostly on the left side and mostly describes the symptoms as stiff and sore.  She denies focal radicular symptoms but does experience referral to the left hip.  Symptoms are at their worst in the evening when attempting to sleep as well as first thing in morning.  In general movement is better than being stationary.  She reports that she will sit on a doughnut at work which helps her get through her workday.  Previous chiropractic care has been helpful.  She informs me of being diagnosed with idiopathic scoliosis at the age 13 and has been diligent with home exercises to help improve/prevent worsening of this curve.  She also informs me of being diagnosed in the past with lumbar stenosis.    Other active problems do include skin cancer.)    Review of Systems   Constitutional:  Negative for fatigue and fever.   HENT:  Negative for trouble swallowing.    Eyes:  Negative for visual disturbance.   Respiratory:  Negative for chest tightness.    Cardiovascular:  Negative for chest pain and leg swelling.   Gastrointestinal:  Negative for abdominal pain, constipation and diarrhea.   Genitourinary:  Negative for frequency.   Musculoskeletal:  Positive for back pain. Negative for joint swelling.   Neurological:  Negative for syncope.   Psychiatric/Behavioral:  Negative for confusion.    All other systems reviewed and are negative.      Relevant Imaging:  Pelvis x-rays performed on July 24, 2024 do reveal calcified region near the left rater trochanter consistent with calcific tendinitis.    Objective     The patient is alert and oriented x 3.  Cranial nerves II-XII are grossly intact.  Less difficulty with transitional movements is observed.  Decreased lumbar lordosis. Lowered left iliac crest.  Leg length is symmetric.  No deficits observed when analyzing gait.  No scarring is present.  No evidence of muscle wasting.    Moderate hypertonicity and tenderness is present in the thoracic paraspinals, lumbar paraspinals, quadratus lumborum, upper gluteals, piriformis, hamstrings.  Findings are greater on the left.    Segmental joint dysfunction and asymmetry was assessed with motion and static palpation and is identified in the following areas:  Cervical:   Thoracic:   Lumbopelvic: L4/5, L5/S1, Counter-nutation of Left Sacral Base, Right SI, Left SI.  PI ilium on left.  Extremities:     Improvement noted related to objective findings.  -----------------------------------------------------------------------------------------------------------------------------------  Recall from initial visit:    Range of Motion Testing: Lumbar spine extension and left sidebending are reduced, with pain reported during left sidebending.    Myotomes/Strength Testing: Single-leg standing is normal.  Patient performs two thirds of a squat without pain.  Toe walk is normal.  Heel walk is normal.    Reflexes: Lower extremity reflexes are diffusely graded 2+.  Steiner's reflex is negative.    Special Tests/Orthopedics: Slump test is negative bilaterally.  Nachlas test is provocative when performed on the right.  Yeomans test is provocative on the left.    Today's treatment:  Drop-table manipulation applied to the: left > right  SI joint.  Flexion-distraction applied to lumbar spine.       Graston technique applied to left lumbar paraspinals and gluteals for 5 minutes.    (Advise patient on lateral gluteal strengthening.)    Assessment/Plan   While the patient is improving, there are some deficits that remain when considering this I have decided to refer the patient for physical  therapy to assist in strengthening.    (The patient's initial presentation is consistent with the following differential diagnoses: Segmental and somatic dysfunction of the lumbar, sacral and pelvic regions, lumbar spondylitic changes as well as postural strain secondary to scoliosis.  I do not identify any contraindications to care.  Treatment will include joint manipulation to tolerance and various myofascial techniques such as dry needling.  We will monitor her response to care to determine if any changes need to occur or if imaging is required.)    Treatment Plan:   The goals of care include: reduce pain levels, improve mobility and functional status.  The patient tolerated today's treatment with little or no additional discomfort and was instructed to contact the office for questions or concerns. They were advised on the potential for post-treatment soreness.   Frequency: As needed  Patient was informed that I am leaving the office.  I advised her that she should continue care with Dr. Clements.

## 2024-07-31 ENCOUNTER — TELEPHONE (OUTPATIENT)
Dept: DERMATOLOGY | Facility: CLINIC | Age: 59
End: 2024-07-31
Payer: COMMERCIAL

## 2024-09-11 ENCOUNTER — APPOINTMENT (OUTPATIENT)
Dept: DERMATOLOGY | Facility: CLINIC | Age: 59
End: 2024-09-11
Payer: COMMERCIAL

## 2024-09-11 DIAGNOSIS — L57.0 ACTINIC KERATOSIS: ICD-10-CM

## 2024-09-11 DIAGNOSIS — L98.8 RHYTIDES: Primary | ICD-10-CM

## 2024-09-11 PROCEDURE — 1036F TOBACCO NON-USER: CPT | Performed by: DERMATOLOGY

## 2024-09-11 PROCEDURE — 17000 DESTRUCT PREMALG LESION: CPT

## 2024-09-11 PROCEDURE — 99214 OFFICE O/P EST MOD 30 MIN: CPT | Performed by: DERMATOLOGY

## 2024-09-11 RX ORDER — TRETINOIN 0.5 MG/G
CREAM TOPICAL NIGHTLY
Qty: 45 G | Refills: 11 | Status: SHIPPED | OUTPATIENT
Start: 2024-09-11 | End: 2025-09-11

## 2024-09-11 ASSESSMENT — DERMATOLOGY PATIENT ASSESSMENT
HAVE YOU HAD OR DO YOU HAVE A STAPH INFECTION: NO
DO YOU USE A TANNING BED: NO
DO YOU USE SUNSCREEN: OCCASIONALLY
DO YOU HAVE IRREGULAR MENSTRUAL CYCLES: NO
ARE YOU TRYING TO GET PREGNANT: NO
ARE YOU AN ORGAN TRANSPLANT RECIPIENT: NO
HAVE YOU HAD OR DO YOU HAVE VASCULAR DISEASE: NO
DO YOU HAVE ANY NEW OR CHANGING LESIONS: YES
ARE YOU ON BIRTH CONTROL: NO

## 2024-09-11 ASSESSMENT — ITCH NUMERIC RATING SCALE: HOW SEVERE IS YOUR ITCHING?: 0

## 2024-09-11 ASSESSMENT — DERMATOLOGY QUALITY OF LIFE (QOL) ASSESSMENT
RATE HOW BOTHERED YOU ARE BY SYMPTOMS OF YOUR SKIN PROBLEM (EG, ITCHING, STINGING BURNING, HURTING OR SKIN IRRITATION): 0 - NEVER BOTHERED
DATE THE QUALITY-OF-LIFE ASSESSMENT WAS COMPLETED: 67094
RATE HOW EMOTIONALLY BOTHERED YOU ARE BY YOUR SKIN PROBLEM (FOR EXAMPLE, WORRY, EMBARRASSMENT, FRUSTRATION): 2
RATE HOW BOTHERED YOU ARE BY EFFECTS OF YOUR SKIN PROBLEMS ON YOUR ACTIVITIES (EG, GOING OUT, ACCOMPLISHING WHAT YOU WANT, WORK ACTIVITIES OR YOUR RELATIONSHIPS WITH OTHERS): 0 - NEVER BOTHERED

## 2024-09-11 ASSESSMENT — PATIENT GLOBAL ASSESSMENT (PGA): PATIENT GLOBAL ASSESSMENT: PATIENT GLOBAL ASSESSMENT:  1 - CLEAR

## 2024-09-11 NOTE — PROGRESS NOTES
Subjective     Charlene Gu is a 59 y.o. female who presents for the following: Suspicious Skin Lesion (Right side above lip new spot.  Red and raised, does not itch. Does get dry and flaky.). Patient is here for a spot check to the right of the philtrum, states present for the past month. Last total body skin check was 6/5/24. She is also asking questions about topical estrogen cream.      Skin Cancer History  No skin cancer on file.      Review of Systems:  No other skin or systemic complaints other than what is documented elsewhere in the note.    The following portions of the chart were reviewed this encounter and updated as appropriate:       Specialty Problems          Dermatology Problems    Personal history of other malignant neoplasm of skin     -Moderate Dysplastic Nevus. Year Diagnosed: 2022. June. Location: Right Calf. Treatment(s): margins clear, no tx needed. Pathology: [id number]   -Basal Cell Carcinoma. Year Diagnosed: 2006 Location: Right Cheek. S/p excision           Past Medical History:  Charlene Gu  has no past medical history on file.    Past Surgical History:  Charlene Gu  has no past surgical history on file.    Family History:  Patient family history is not on file.    Social History:  Charlene Gu  reports that she has never smoked. She has never used smokeless tobacco. No history on file for alcohol use and drug use.    Allergies:  Patient has no known allergies.    Current Medications / CAM's:    Current Outpatient Medications:     simvastatin (Zocor) 80 mg tablet, Take by mouth., Disp: , Rfl:     tretinoin (Retin-A) 0.05 % cream, Apply topically once daily at bedtime. A pea-sized amount to cover the whole face; start every 2-3 nights and gradually increase to nightly, Disp: 45 g, Rfl: 11     Objective   Well appearing patient in no apparent distress; mood and affect are within normal limits.    A focused examination was performed of the face. All findings within normal  limits unless otherwise noted below.    Fine lines and hyperpigmented spots on the face    Right Upper Cutaneous Lip  Erythematous macules with gritty scale.         Assessment/Plan   Rhytides    -Patient asked about starting topical estrogen cream for volume loss. Discussed how this can help with plumping in the under eye area. Counseled that it is safe to use topical over the counter estrogen creams on the face  -Advised talking to the gynecologist about using an over the counter estrogen cream at her next visit due to theoretical risk of systemic absorption with prolonged/extensive use to review side effect potential.   -Start nightly retinoid (tretinoin 0.05% cream). Counseled on use with moisturizer, applying a pea-sized amount, and increasing frequency of use from 2-3 nights a week to nightly as tolerated to avoid irritation. Increase to nightly as tolerated. Use SPF in AM-- sunscreen samples given today.     tretinoin (Retin-A) 0.05 % cream  Apply topically once daily at bedtime. A pea-sized amount to cover the whole face; start every 2-3 nights and gradually increase to nightly    Actinic keratosis  Right Upper Cutaneous Lip    - The premalignant nature of the disorder was reviewed and treatment options were reviewed.   - Patient agreeable to treatment with cryotherapy today.  Sites confirmed. Risks and benefits reviewed including but not limited to pain, redness, swelling, blister, scab, healing with hypo or hyperpigmentation, and scar. Chance of recurrence or persistence reviewed.   -Discussed efudex as a potential field therapy in the future, as patient was asking about this treatment. Counseled this is an option if she can not tolerate cryotherapy or if she develops many actinic keratoses in the future.  -Counseled efudex is used twice a day for 2-3 weeks, there is a longer healing period than with cryotherapy and have to stay out of the sun during the healing period.    Destr of lesion - Right Upper  Cutaneous Lip  Complexity: simple    Destruction method: cryotherapy    Informed consent: discussed and consent obtained    Lesion destroyed using liquid nitrogen: Yes    Region frozen until ice ball extended beyond lesion: Yes    Cryotherapy cycles:  1  Outcome: patient tolerated procedure well with no complications    Post-procedure details: wound care instructions given         Return to clinic 6/11/15 for full body skin exam.    Oma Mcqueen MD  Department of Dermatology    I saw and evaluated the patient, participating in the key elements of the service.  I discussed the findings, assessment and plan with the resident and agree with resident’s findings and plan as documented in the resident's note.  I was immediately available for the entirety of the procedure(s) and present for the key and critical portions.     Sherrill Aguilera MD

## 2024-09-12 ENCOUNTER — EVALUATION (OUTPATIENT)
Dept: PHYSICAL THERAPY | Facility: CLINIC | Age: 59
End: 2024-09-12
Payer: COMMERCIAL

## 2024-09-12 DIAGNOSIS — M25.552 PAIN IN LEFT HIP: ICD-10-CM

## 2024-09-12 PROCEDURE — 97110 THERAPEUTIC EXERCISES: CPT | Mod: GP | Performed by: PHYSICAL THERAPIST

## 2024-09-12 PROCEDURE — 97161 PT EVAL LOW COMPLEX 20 MIN: CPT | Mod: GP | Performed by: PHYSICAL THERAPIST

## 2024-09-12 ASSESSMENT — PAIN SCALES - GENERAL: PAINLEVEL_OUTOF10: 6

## 2024-09-12 ASSESSMENT — ENCOUNTER SYMPTOMS
LOSS OF SENSATION IN FEET: 0
DEPRESSION: 0
OCCASIONAL FEELINGS OF UNSTEADINESS: 0

## 2024-09-12 ASSESSMENT — PAIN - FUNCTIONAL ASSESSMENT: PAIN_FUNCTIONAL_ASSESSMENT: 0-10

## 2024-09-12 NOTE — PROGRESS NOTES
Physical Therapy  Physical Therapy Orthopedic Evaluation    Patient Name: Charlene Gu  MRN: [id number]  Today's Date: 9/12/2024  Time Calculation  Start Time: 1500  Stop Time: 1545  Time Calculation (min): 45 min    Insurance:  Visit number: 1of 60  Authorization info: No auth needed  Insurance Type: [employer]    General:  Reason for visit: L hip and low back pain   Referred by: Noel Mckenna DC       Current Problem:  1. Pain in left hip  PT eval and treat    Follow Up In Physical Therapy          Precautions: none       Medical History Form: Reviewed (scanned into chart)    Subjective:   Chief Complaint: Patient presents to clinic with low back and L hip pain. She has had scoliosis since she was a teen and has been doing her exercises since. She had a flare up in her symptoms about 1 year ago that was disrupting her sleep therefore she saw a chiropractor but this did not help.   Onset Date: 9/1/2023  ROZ: Chronic    Current Condition:   Same- exercises seem to help things loosen up temporarily, but never complete relief like she used to get.     Pain:  Pain Assessment: 0-10  0-10 (Numeric) Pain Score: 6  Location: L low back and L posterior and lateral hip   Description: ache/nerve pinching feeling   Aggravating Factors: rolling over while sleeping in bed   Relieving Factors:  Heat and Stretching and exercises    Relevant Information (PMH & Previous Tests/Imaging):    IMPRESSION:  Findings concerning for calcific tendinitis of the left gluteal  tendons at the level of insertion on left femur greater trochanter.  Previous Interventions/Treatments: Chiropractic    % of Prior Level of Function (PLOF): 70%  Patient previously independent with all ADLs, currently has difficulty with sleeping, prolonged walking   Exercise/Physical Activity: walking  Work/School: Full time insurance rep     Patients Living Environment: Reviewed and no concern    Primary Language: English    There are no spiritual/cultural  "practices/values/needs that are important to know    Patient's Goal(s) for Therapy: \"to lessen pain to be able to sleep through the night\"    Red Flags: Do you have any of the following? Yes- night pain in the L hip   Fever/chills, unexplained weight changes, dizziness/fainting, unexplained change in bowel or bladder functions, unexplained malaise or muscle weakness, night pain/sweats, numbness or tingling        Objective:  Objective     ROM    Lumbar AROM (%)    Flexion: WNl  Extension: WNL  (L) Side Bend: WNL  (R) Side Bend: WNL with mild stretching along L glute  (L) Rotation: WNL  (R) Rotation: WNL with mild \"pulling\" sensation along L glute       Hip AROM (Degrees)  All WNL except L hip ER and IR with slight limitation          Strength Testing    Core/Abdominals: 4/5      Hip    (R)  (L)  Flexion: 5/5  5/5     Extension: 4+/5  4/5    Abduction: 4-/5  4+/5    Adduction: 5/5  5/5    ER:  5-/5  4+/5    IR:  5/5  4+/5        Palpation: increased tone along L glute med, piriformis, and lumbar paraspinals      Flexibility: mod limitation in L piriformis       Gait: heel to toe gait under no visible distress        Functional Screening    Squat: WNL with some discomfort noted along L glute  Lunge: NT  Lateral Step Down: NT  Hinge: NT  SL Balance: NT  SL Quarter Squat: NT        Special Tests    Leg Length Discrepancy: None noted  SI Alignment:   90-90 SLR Test: -  Bennie Test: +  DEYA Test: -  Slump Test: -    Radicular Symptoms: none present    No signs or symptoms of DVT    Outcome Measures:    LEFS: 76/80      EDUCATION: Home exercise program, plan of care, activity modifications, pain management, and injury pathology       Goals: Set and discussed today  Active       PT Problem       PT Goal 1       Start:  10/01/24    Expected End:  11/30/24       In 4 weeks, patient will improve piriformis flexibility to minimal limitation.     In 4 weeks, patient will improve pain rating to <3/10 in order to sleep through " the night without pain.     In 4 weeks, patient will report resolution of posterior hip pain.     In 4 weeks, patient will demonstrate improved standing tolerance in order to perform household tasks such as washing dishes and vacuuming.     In 4 weeks, patient will improve LEFS score my 9.     In 4 weeks, patient will be independent with a final HEP.               Plan of care was developed with input and agreement by the patient      Treatment Performed:  Education:Initial evaluation completed, findings and plan of care discussed with patient. Patient education on purpose of interventions in order to improve LE stability and strength. Patient education on anatomy associated with diagnosis. Patient encouraged to use pain as guide with interventions and activity. Patient performed and was instructed in an individualized HEP with handout issued as below.         Assessment: Patient presents with signs and symptoms consistent with L hip pain and low back pain, resulting in limited participation in pain-free ADLs and inability to perform at their prior level of function. Pt would benefit from physical therapy to address the impairments found & listed previously in the objective section in order to return to safe and pain-free ADLs and prior level of function.       Clinical Presentation: Stable and/or uncomplicated characteristics    Plan:     Planned Interventions include: therapeutic exercise, self-care home management, manual therapy, therapeutic activities, gait training, neuromuscular coordination, vasopneumatic, dry needling, aquatic therapy  Frequency: 1-2 x Week  Duration: 8 Weeks  Rehab Potential/Prognosis: Excellent      Arielle Tubbs, PT

## 2024-09-13 ENCOUNTER — TELEPHONE (OUTPATIENT)
Dept: DERMATOLOGY | Facility: CLINIC | Age: 59
End: 2024-09-13
Payer: COMMERCIAL

## 2024-09-19 ENCOUNTER — TREATMENT (OUTPATIENT)
Dept: PHYSICAL THERAPY | Facility: CLINIC | Age: 59
End: 2024-09-19
Payer: COMMERCIAL

## 2024-09-19 DIAGNOSIS — M25.552 PAIN IN LEFT HIP: ICD-10-CM

## 2024-09-19 PROCEDURE — 97140 MANUAL THERAPY 1/> REGIONS: CPT | Mod: GP | Performed by: PHYSICAL THERAPIST

## 2024-09-19 PROCEDURE — 97110 THERAPEUTIC EXERCISES: CPT | Mod: GP | Performed by: PHYSICAL THERAPIST

## 2024-09-19 NOTE — PROGRESS NOTES
Physical Therapy  Physical Therapy Treatment Note    Patient Name: Charlene Gu  MRN: [id number]  Today's Date: 9/19/2024  Time Calculation  Start Time: 1504  Stop Time: 1548  Time Calculation (min): 44 min    Insurance:  Visit number: 2 of 60  Authorization info: No auth needed  Insurance Type: [employer]a    General:  Reason for visit: L hip and low back pain   Referred by: Noel Mckenna DC        Current Problem  1. Pain in left hip  Follow Up In Physical Therapy          Precautions: none       Subjective:     Patient reports an overall decrease in her symptoms since her evaluation. She had symptoms yesterday that were mild, but is feeling better today. No pain reported upon arrival today.     Pain   0/10    Performing HEP?: Yes      Objective:   Increased tone alone L lumbar paraspinals, glute med, and piriformis       Treatment Performed:  - stepper x 6'   - STM and manual stretching to L lumbar paraspinals, glute med, and piriformis x 10'  - piriformis stretch x 1' B  - knee to chest stretch x 1' B   - PB HSC x 20  - PB LTR x 10 B   - PB marches in supine x 30   - reviewed HEP        Assessment:   Patient able to tolerate treatment very well noting an improvement in her glute symptoms upon completion of treatment. She does have increased tone in her lumbar paraspinals and piriformis therefore will review self STM for HEP.      Plan:  Continue to perform STM and flexibility exercises as tolerated.       Arielle Tubbs, PT

## 2024-09-25 ENCOUNTER — TREATMENT (OUTPATIENT)
Dept: PHYSICAL THERAPY | Facility: CLINIC | Age: 59
End: 2024-09-25
Payer: COMMERCIAL

## 2024-09-25 DIAGNOSIS — M25.552 PAIN IN LEFT HIP: Primary | ICD-10-CM

## 2024-09-25 PROCEDURE — 97110 THERAPEUTIC EXERCISES: CPT | Mod: GP,CQ

## 2024-09-25 NOTE — PROGRESS NOTES
"  Physical Therapy  Physical Therapy Treatment Note    Patient Name: Charlene Gu  MRN: [id number]  Today's Date: 9/25/2024  Time Calculation  Start Time: 1445  Stop Time: 1525  Time Calculation (min): 40 min    Insurance:  Visit number: 3 of 60  Authorization info: No auth needed  Insurance Type: Cigna    General:  Reason for visit: L hip and low back pain   Referred by: Noel Mckenna DC      Current Problem  1. Pain in left hip  Follow Up In Physical Therapy        Precautions:       Subjective:   Patient reports  that they are still experiencing pain at night but believes things have generally gotten better since starting therapy.     Pain   0/10    Performing HEP?: Yes      Objective:   Trunk rotation compensation noted greater when LLE was planted on plinth during bird dog.       Treatment Performed:  - stepper x 6'   - Prone lying 1'   - supine head raise 3\" 2x10 (ADDED TO HEP)  - bird dog 3\" 2x5 (ADDED TO HEP)  - modifed side plank 2x5 5\" (ADDED TO HEP)  - seated hip abduction 25# 3x10   Access Code: [email or web address]  URL: [email or web address]  Date: 09/25/2024  Prepared by: Carlos Lopez    Exercises  - Side Plank on Knees  - 1 x daily - 7 x weekly - 2 sets - 5 reps - 5 seconds hold  - Neutral Curl Up with Straight Leg  - 1 x daily - 7 x weekly - 2 sets - 5 reps - 5 seconds  hold  - Bird Dog  - 1 x daily - 7 x weekly - 2 sets - 5 reps - 5 seconds  hold  Assessment:   Post core stability movements pt reported decreased levels of pain during prone to seated bed mobility. Pt was given cueing to increase lateral core brace during all core based movements in today's treatment session and were added to home HEP.        Plan:  Continue to progress per POC. Follow up with newly added core bracing exercises and holding off on partial sit up exercise.       Carlos Lopez, PTA    "

## 2024-10-02 ENCOUNTER — TREATMENT (OUTPATIENT)
Dept: PHYSICAL THERAPY | Facility: CLINIC | Age: 59
End: 2024-10-02
Payer: COMMERCIAL

## 2024-10-02 DIAGNOSIS — M25.552 PAIN IN LEFT HIP: ICD-10-CM

## 2024-10-02 PROCEDURE — 97110 THERAPEUTIC EXERCISES: CPT | Mod: GP | Performed by: PHYSICAL THERAPIST

## 2024-10-02 PROCEDURE — 97112 NEUROMUSCULAR REEDUCATION: CPT | Mod: GP | Performed by: PHYSICAL THERAPIST

## 2024-10-02 NOTE — PROGRESS NOTES
"  Physical Therapy  Physical Therapy Treatment Note    Patient Name: Charlene Gu  MRN: [id number]  Today's Date: 10/2/2024  Time Calculation  Start Time: 1117  Stop Time: 1200  Time Calculation (min): 43 min    Insurance:  Visit number: 4 of 60  Authorization info: No auth needed  Insurance Type: [employer]    General:  Reason for visit: L hip and low back pain   Referred by: Noel Mckenna DC      Current Problem  1. Pain in left hip  Follow Up In Physical Therapy        Precautions: none      Subjective:   Patient reports her L hip and low back have overall been feeling better. She no longer experiences clicking.     Pain   0/10    Performing HEP?: Yes      Objective:   Trunk rotation compensation noted greater when LLE was planted on plinth during bird dog- able to correct with verbal cueing      Treatment Performed:  - stepper x 6'   - Prone lying 1'   - bird dog x 10 B  - PB HSC x 20   - PB LTR x 20   - PB marches in supine x 10 B   - supine head raise 3\" 2x10 (omitted due to neck pain)  - PPT 5 second holds, x 20   - modifed side plank 2x5 5\"     Charges:  2- TE  1- NME      Access Code: [email or web address]  URL: [email or web address]  Date: 09/25/2024  Prepared by: Carlos Lopez    Exercises  - Side Plank on Knees  - 1 x daily - 7 x weekly - 2 sets - 5 reps - 5 seconds hold  - Neutral Curl Up with Straight Leg  - 1 x daily - 7 x weekly - 2 sets - 5 reps - 5 seconds  hold  - Bird Dog  - 1 x daily - 7 x weekly - 2 sets - 5 reps - 5 seconds  hold    Assessment:   Omitted supine head raises this visit as patient reported neck strain/pain with this exercise. Able to complete remainder of exercises without an increase in symptoms. Patient would continue to benefit from skilled PT to further improve core strength/stability.       Plan:  Continue to progress per POC. Follow up with newly added core bracing exercises and holding off on partial sit up exercise.       Arielle Tubbs, PT    "

## 2024-10-16 ENCOUNTER — TREATMENT (OUTPATIENT)
Dept: PHYSICAL THERAPY | Facility: CLINIC | Age: 59
End: 2024-10-16
Payer: COMMERCIAL

## 2024-10-16 DIAGNOSIS — M25.552 PAIN IN LEFT HIP: ICD-10-CM

## 2024-10-16 PROCEDURE — 97110 THERAPEUTIC EXERCISES: CPT | Mod: GP,CQ

## 2024-10-16 ASSESSMENT — PAIN - FUNCTIONAL ASSESSMENT: PAIN_FUNCTIONAL_ASSESSMENT: 0-10

## 2024-10-16 ASSESSMENT — PAIN SCALES - GENERAL: PAINLEVEL_OUTOF10: 3

## 2024-10-16 NOTE — PROGRESS NOTES
"  Physical Therapy  Physical Therapy Treatment Note    Patient Name: Charlene Gu  MRN: [id number]  Today's Date: 10/16/2024  Time Calculation  Start Time: 1400  Stop Time: 1441  Time Calculation (min): 41 min    Insurance:  Visit number: 5 of 60  Authorization info: No auth needed  Insurance Type: [employer]    General:  Reason for visit: L hip and low back pain   Referred by: Noel Mckenna DC      Current Problem  1. Pain in left hip  Follow Up In Physical Therapy        Precautions: none    Subjective:   Patient reports that they are able to crack their back more since having started therapy and that this is a good thing. Pt reports not having any pain today more just stiffness.     Pain  Pain Assessment: 0-10  0-10 (Numeric) Pain Score: 3    Performing HEP?: Yes    Objective:   Contralateral trunk rotation during cybex walk outs during walk back. Pt was cued and able to self correct after this.     Treatment Performed:  - stepper x 6'   - cybex walk outs 3x10 12.5 #  - hip abduction 3x10 4\" holds (HEP)   - hip flexor stretch with stretch strap 1' !p  - stirs the pots 2x10 10#  - DKTC 1.5'     Charges:  3 - TE     Access Code: [email or web address]  URL: [email or web address]  Date: 09/25/2024  Prepared by: Carlos Lopez    Exercises  - Side Plank on Knees  - 1 x daily - 7 x weekly - 2 sets - 5 reps - 5 seconds hold  - Neutral Curl Up with Straight Leg  - 1 x daily - 7 x weekly - 2 sets - 5 reps - 5 seconds  hold  - Bird Dog  - 1 x daily - 7 x weekly - 2 sets - 5 reps - 5 seconds  hold    Assessment:   Pt reported an increase in back pain while performing hip flexor stretch strap in lower back but reported a good stretch during it. Pt reported that this pain went away after DKTC with red PB.   Plan:  Continue to progress core stability and hip abductor strengthening       Carlos Lopez, PTA    "

## 2024-10-24 ENCOUNTER — TREATMENT (OUTPATIENT)
Dept: PHYSICAL THERAPY | Facility: CLINIC | Age: 59
End: 2024-10-24
Payer: COMMERCIAL

## 2024-10-24 DIAGNOSIS — M25.552 PAIN IN LEFT HIP: ICD-10-CM

## 2024-10-24 PROCEDURE — 97110 THERAPEUTIC EXERCISES: CPT | Mod: GP | Performed by: PHYSICAL THERAPIST

## 2024-10-24 PROCEDURE — 97112 NEUROMUSCULAR REEDUCATION: CPT | Mod: GP | Performed by: PHYSICAL THERAPIST

## 2024-10-24 NOTE — PROGRESS NOTES
Physical Therapy  Physical Therapy Treatment Note/Discharge summary    Patient Name: Charlene Gu  MRN: [id number]  Today's Date: 10/24/2024  Time Calculation  Start Time: 1500  Stop Time: 1540  Time Calculation (min): 40 min    Insurance:  Visit number: 6 of 60  Authorization info: No auth needed  Insurance Type: [employer]    General:  Reason for visit: L hip and low back pain   Referred by: Noel Mckenna DC      Current Problem  1. Pain in left hip  Follow Up In Physical Therapy        Precautions: none    Subjective:   Patient reports a consistent improvement in her symptoms. If she performs her exercises regularly, she does not experience as much stiffness. She is getting close to being able to manage her symptoms independently at this time.     Pain   1/10    Performing HEP?: Yes    Objective:   Contralateral trunk rotation during cybex walk outs during walk back. Pt was cued and able to self correct after this.     Treatment Performed:  - recumbent bike x 5'   - PPT x 20  - supine march with TA set x 20   - quadruped alternating LE lifts x 20  - quadruped alternation UE + LE lifts x 10   - cat cow x 10   - piriformis stretch x 1' B  - cybex walk outs 3x10 12.5 #  - DKTC 1.5'     Charges:  1- TE  2- NME    Access Code: [email or web address]  URL: [email or web address]  Date: 09/25/2024  Prepared by: Carlos Lopez    Exercises  - Side Plank on Knees  - 1 x daily - 7 x weekly - 2 sets - 5 reps - 5 seconds hold  - Neutral Curl Up with Straight Leg  - 1 x daily - 7 x weekly - 2 sets - 5 reps - 5 seconds  hold  - Bird Dog  - 1 x daily - 7 x weekly - 2 sets - 5 reps - 5 seconds  hold    Assessment:   Patient able to complete all exercises without an increase in symptoms. She does demonstrate some trunk rotation with exercises in quadruped due to core stability limitations, otherwise demonstrates excellent form and body mechanics with remainder of exercises. If patient is able to perform all exercises without  significant cueing, she may be appropriate for discharge next visit.      Plan:  Recheck with possible discharge next visit.       Arielle Tubbs, PT

## 2024-10-29 ENCOUNTER — TREATMENT (OUTPATIENT)
Dept: PHYSICAL THERAPY | Facility: CLINIC | Age: 59
End: 2024-10-29
Payer: COMMERCIAL

## 2024-10-29 DIAGNOSIS — M25.552 PAIN IN LEFT HIP: ICD-10-CM

## 2024-10-29 PROCEDURE — 97535 SELF CARE MNGMENT TRAINING: CPT | Mod: GP | Performed by: PHYSICAL THERAPIST

## 2024-10-29 PROCEDURE — 97110 THERAPEUTIC EXERCISES: CPT | Mod: GP | Performed by: PHYSICAL THERAPIST

## 2024-12-26 ENCOUNTER — APPOINTMENT (OUTPATIENT)
Dept: RADIOLOGY | Facility: CLINIC | Age: 59
End: 2024-12-26
Payer: COMMERCIAL

## 2025-04-28 ENCOUNTER — HOSPITAL ENCOUNTER (OUTPATIENT)
Dept: RADIOLOGY | Facility: CLINIC | Age: 60
Discharge: HOME | End: 2025-04-28
Payer: COMMERCIAL

## 2025-04-28 DIAGNOSIS — Z13.6 ENCOUNTER FOR SCREENING FOR CARDIOVASCULAR DISORDERS: ICD-10-CM

## 2025-04-28 PROCEDURE — 75571 CT HRT W/O DYE W/CA TEST: CPT

## 2025-05-22 ENCOUNTER — HOSPITAL ENCOUNTER (OUTPATIENT)
Dept: RADIOLOGY | Facility: CLINIC | Age: 60
Discharge: HOME | End: 2025-05-22
Payer: COMMERCIAL

## 2025-05-22 DIAGNOSIS — Z78.0 ASYMPTOMATIC MENOPAUSAL STATE: ICD-10-CM

## 2025-05-22 PROCEDURE — 77080 DXA BONE DENSITY AXIAL: CPT

## 2025-06-11 ENCOUNTER — APPOINTMENT (OUTPATIENT)
Dept: DERMATOLOGY | Facility: CLINIC | Age: 60
End: 2025-06-11
Payer: COMMERCIAL

## 2025-06-11 DIAGNOSIS — L82.1 SEBORRHEIC KERATOSES: ICD-10-CM

## 2025-06-11 DIAGNOSIS — Z12.83 ENCOUNTER FOR SCREENING FOR MALIGNANT NEOPLASM OF SKIN: ICD-10-CM

## 2025-06-11 DIAGNOSIS — L90.5 SCAR CONDITIONS AND FIBROSIS OF SKIN: ICD-10-CM

## 2025-06-11 DIAGNOSIS — D18.01 CHERRY ANGIOMA: ICD-10-CM

## 2025-06-11 DIAGNOSIS — L57.8 SUN-DAMAGED SKIN: ICD-10-CM

## 2025-06-11 DIAGNOSIS — D22.9 MULTIPLE BENIGN MELANOCYTIC NEVI: Primary | ICD-10-CM

## 2025-06-11 PROCEDURE — 1036F TOBACCO NON-USER: CPT | Performed by: DERMATOLOGY

## 2025-06-11 PROCEDURE — 99213 OFFICE O/P EST LOW 20 MIN: CPT | Performed by: DERMATOLOGY

## 2025-06-11 ASSESSMENT — DERMATOLOGY PATIENT ASSESSMENT
DO YOU HAVE IRREGULAR MENSTRUAL CYCLES: NO
ARE YOU AN ORGAN TRANSPLANT RECIPIENT: NO
ARE YOU TRYING TO GET PREGNANT: NO
DO YOU USE A TANNING BED: NO
HAVE YOU HAD OR DO YOU HAVE VASCULAR DISEASE: NO
HAVE YOU HAD OR DO YOU HAVE A STAPH INFECTION: NO
ARE YOU ON BIRTH CONTROL: NO
DO YOU HAVE ANY NEW OR CHANGING LESIONS: NO
DO YOU USE SUNSCREEN: OCCASIONALLY

## 2025-06-11 ASSESSMENT — DERMATOLOGY QUALITY OF LIFE (QOL) ASSESSMENT
RATE HOW EMOTIONALLY BOTHERED YOU ARE BY YOUR SKIN PROBLEM (FOR EXAMPLE, WORRY, EMBARRASSMENT, FRUSTRATION): 0 - NEVER BOTHERED
RATE HOW BOTHERED YOU ARE BY EFFECTS OF YOUR SKIN PROBLEMS ON YOUR ACTIVITIES (EG, GOING OUT, ACCOMPLISHING WHAT YOU WANT, WORK ACTIVITIES OR YOUR RELATIONSHIPS WITH OTHERS): 0 - NEVER BOTHERED
WHAT SINGLE SKIN CONDITION LISTED BELOW IS THE PATIENT ANSWERING THE QUALITY-OF-LIFE ASSESSMENT QUESTIONS ABOUT: NONE OF THE ABOVE
ARE THERE EXCLUSIONS OR EXCEPTIONS FOR THE QUALITY OF LIFE ASSESSMENT: NO
RATE HOW BOTHERED YOU ARE BY SYMPTOMS OF YOUR SKIN PROBLEM (EG, ITCHING, STINGING BURNING, HURTING OR SKIN IRRITATION): 0 - NEVER BOTHERED
DATE THE QUALITY-OF-LIFE ASSESSMENT WAS COMPLETED: 67367

## 2025-06-11 ASSESSMENT — PATIENT GLOBAL ASSESSMENT (PGA): PATIENT GLOBAL ASSESSMENT: PATIENT GLOBAL ASSESSMENT:  1 - CLEAR

## 2025-06-11 ASSESSMENT — ITCH NUMERIC RATING SCALE: HOW SEVERE IS YOUR ITCHING?: 0

## 2025-06-11 NOTE — Clinical Note
-Moderate Dysplastic Nevus. Year Diagnosed: 2022. June. Location: Right Calf. Treatment(s): margins clear, no tx needed. Pathology: [id number]   -Basal Cell Carcinoma. Year Diagnosed: 2006 Location: Right Cheek. S/p excision   H/o Actinic keratoses

## 2025-06-11 NOTE — PROGRESS NOTES
Subjective     Charlene Gu is a 60 y.o. female who presents for the following: Skin Check.    Treated Actinic keratoses on upper cutaneous lip with cryotherapy in 2024 with good success. Discussed option of efudex in future with new/recurring Actinic keratoses   Also given tretinoin at that visit for wrinkles  Last skin check 6/2024 benign with only Actinic keratoses   Scattered spots to check today  Skin Cancer History  Biopsy Log Book  No skin cancers from Specimen Tracking.    Additional History  -Moderate Dysplastic Nevus. Year Diagnosed: 2022. June. Location: Right Calf. Treatment(s): margins clear, no tx needed. Pathology: [id number]   -Basal Cell Carcinoma. Year Diagnosed: 2006 Location: Right Cheek. S/p excision   H/o Actinic keratoses     Review of Systems:  No other skin or systemic complaints other than what is documented elsewhere in the note.    The following portions of the chart were reviewed this encounter and updated as appropriate:       Specialty Problems          Dermatology Problems    Personal history of other malignant neoplasm of skin    -Moderate Dysplastic Nevus. Year Diagnosed: 2022. June. Location: Right Calf. Treatment(s): margins clear, no tx needed. Pathology: [id number]   -Basal Cell Carcinoma. Year Diagnosed: 2006 Location: Right Cheek. S/p excision           Past Medical History:  Charlene Gu  has no past medical history on file.    Past Surgical History:  Charlene Gu  has no past surgical history on file.    Family History:  Patient family history is not on file.    Social History:  Charlene Gu  reports that she has never smoked. She has never used smokeless tobacco. No history on file for alcohol use and drug use.    Allergies:  Patient has no known allergies.    Current Medications / CAM's:  Current Medications[1]     Objective   Well appearing patient in no apparent distress; mood and affect are within normal limits.    A full examination was performed including  scalp, head, eyes, ears, nose, lips, neck, chest, axillae, abdomen, back, buttocks, bilateral upper extremities, bilateral lower extremities, hands, feet, fingers, toes, fingernails, and toenails. Patient declined genital and gluteal cleft exam.  All findings within normal limits unless otherwise noted below.    - scattered regular brown macules and papules  - Scattered waxy tan/grey/brown papules with horn cysts  - scattered small bright red papules and macules  - Well healed scar at prior treatment sites without visual or palpable evidence of recurrence.   - scattered tan macules, telangiectasias, and general photo-damage       Assessment/Plan   MULTIPLE BENIGN MELANOCYTIC NEVI  Generalized  Benign melanocytic nevi  - Discussed benign nature and that no treatment is necessary unless it becomes painful or increases in size. Patient opts for clinical monitoring at this time.    - Sun protective behavior reviewed and encouraged including the use of over-the-counter sunscreen with SPF30+ daily (reapply every 1.5 hours when outdoors), UPF clothing, broad rimmed hats, sunglasses, and avoidance of midday sun. Home skin monitoring encouraged and how to monitor for skin cancer (changing or new moles, new rapidly growing or non-healing lesions) reviewed. Patient encouraged to call with interval concerns or changes.    ENCOUNTER FOR SCREENING FOR MALIGNANT NEOPLASM OF SKIN      Related Procedures  Follow Up In Dermatology - Established Patient  SEBORRHEIC KERATOSES  Generalized  Seborrheic keratosis (-es)  - Discussed benign nature and that no treatment is necessary unless it becomes painful or increases in size. Patient opts for clinical monitoring at this time.    ABRAMS ANGIOMA  Generalized  Cherry angioma(s)  - Discussed benign nature and that no treatment is necessary unless it becomes painful or increases in size. Patient opts for clinical monitoring at this time.    SCAR CONDITIONS AND FIBROSIS OF SKIN  Generalized  -  Well healed scar(s) at sites of prior skin cancer and/or dysplastic nevi. - basal cell carcinoma face and mod dysplastic nevus right calf  - Sun protective behavior reviewed and encouraged including the use of over-the-counter sunscreen with SPF30+ daily (reapply every 1.5 hours when outdoors), UPF clothing, broad rimmed hats, sunglasses, and avoidance of midday sun. Home skin monitoring encouraged and how to monitor for skin cancer (changing or new moles, new rapidly growing or non-healing lesions) reviewed. Patient encouraged to call with interval concerns or changes.     SUN-DAMAGED SKIN  Generalized  Actinically damaged skin-  - Sun protective behavior reviewed and encouraged including the use of over-the-counter sunscreen with SPF30+ daily (reapply every 1.5 hours when outdoors), UPF clothing, broad rimmed hats, sunglasses, and avoidance of midday sun. Home skin monitoring encouraged and how to monitor for skin cancer (changing or new moles, new rapidly growing or non-healing lesions) reviewed. Patient encouraged to call with interval concerns or changes.       1 year fbse  Sherrill Aguilera MD        [1]   Current Outpatient Medications:     simvastatin (Zocor) 80 mg tablet, Take by mouth. (Patient not taking: Reported on 6/11/2025), Disp: , Rfl:     tretinoin (Retin-A) 0.05 % cream, Apply topically once daily at bedtime. A pea-sized amount to cover the whole face; start every 2-3 nights and gradually increase to nightly, Disp: 45 g, Rfl: 11

## 2025-06-11 NOTE — Clinical Note
- Well healed scar(s) at sites of prior skin cancer and/or dysplastic nevi. - basal cell carcinoma face and mod dysplastic nevus right calf  - Sun protective behavior reviewed and encouraged including the use of over-the-counter sunscreen with SPF30+ daily (reapply every 1.5 hours when outdoors), UPF clothing, broad rimmed hats, sunglasses, and avoidance of midday sun. Home skin monitoring encouraged and how to monitor for skin cancer (changing or new moles, new rapidly growing or non-healing lesions) reviewed. Patient encouraged to call with interval concerns or changes.

## 2025-08-07 ENCOUNTER — APPOINTMENT (OUTPATIENT)
Dept: RHEUMATOLOGY | Facility: CLINIC | Age: 60
End: 2025-08-07
Payer: COMMERCIAL

## 2025-08-15 ENCOUNTER — APPOINTMENT (OUTPATIENT)
Dept: RHEUMATOLOGY | Facility: CLINIC | Age: 60
End: 2025-08-15
Payer: COMMERCIAL

## 2025-08-15 VITALS
WEIGHT: 117.6 LBS | HEIGHT: 63 IN | DIASTOLIC BLOOD PRESSURE: 87 MMHG | BODY MASS INDEX: 20.84 KG/M2 | SYSTOLIC BLOOD PRESSURE: 127 MMHG | OXYGEN SATURATION: 97 % | HEART RATE: 111 BPM

## 2025-08-15 DIAGNOSIS — M45.9 ANKYLOSING SPONDYLITIS, UNSPECIFIED SITE OF SPINE (MULTI): ICD-10-CM

## 2025-08-15 DIAGNOSIS — M81.0 OSTEOPOROSIS WITHOUT CURRENT PATHOLOGICAL FRACTURE, UNSPECIFIED OSTEOPOROSIS TYPE: Primary | ICD-10-CM

## 2025-08-15 DIAGNOSIS — M46.1 BILATERAL SACROILIITIS: ICD-10-CM

## 2025-08-15 PROCEDURE — 3008F BODY MASS INDEX DOCD: CPT | Performed by: STUDENT IN AN ORGANIZED HEALTH CARE EDUCATION/TRAINING PROGRAM

## 2025-08-15 PROCEDURE — 1036F TOBACCO NON-USER: CPT | Performed by: STUDENT IN AN ORGANIZED HEALTH CARE EDUCATION/TRAINING PROGRAM

## 2025-08-15 PROCEDURE — 99205 OFFICE O/P NEW HI 60 MIN: CPT | Performed by: STUDENT IN AN ORGANIZED HEALTH CARE EDUCATION/TRAINING PROGRAM

## 2025-08-15 RX ORDER — KRILL/OM-3/DHA/EPA/PHOSPHO/AST 1000-170MG
CAPSULE ORAL
COMMUNITY

## 2025-08-15 RX ORDER — CHOLECALCIFEROL (VITAMIN D3) 100000/G
POWDER (GRAM) MISCELLANEOUS
COMMUNITY

## 2025-08-15 RX ORDER — MAGNESIUM GLUCONATE 27 MG(500)
TABLET ORAL
COMMUNITY

## 2025-08-15 ASSESSMENT — RHEUMATOLOGY NEW PATIENT QUESTIONNAIRE
MEMORY LOSS: N
SKIN REDNESS: N
LOSS OF CONSCIOUSNESS: N
FAINTING: N
ABNORMAL URINE: N
EYE DRYNESS: Y
JOINT SWELLING: N
PAIN OR BURNING ON URINATION: N
HOARSE VOICE: N
NAUSEA: N
DIFFICULTY BREATHING LYING DOWN: N
COUGH: N
UNUSUAL FATIGUE: N
DIFFICULTY SWALLOWING: N
RASH OR ULCERS: N
DEPRESSION: N
UNEXPLAINED WEIGHT CHANGE: N
ANXIETY: N
JOINT PAIN: Y
VAGINAL DRYNESS: Y
FEVER: N
INCREASED SUSCEPTIBILITY TO INFECTION: N
UNEXPLAINED HEARING LOSS: N
CHEST PAIN: N
DIFFICULTY STAYING ASLEEP: Y
NODULES/BUMPS: N
SKIN TIGHTNESS: N
UNUSUALLY RAPID OR SLOWED HEART RATE: N
MUSCLE WEAKNESS: N
JAUNDICE: N
COLOR CHANGES OF HANDS OR FEET IN THE COLD: N
RASH: N
BEHAVIORAL CHANGES: N
PERSISTENT DIARRHEA: N
NUMBNESS OR TINGLING IN HANDS OR FEET: N
EASILY LOSING TEMPER: N
SUN SENSITIVE (SUN ALLERGY): N
SHORTNESS OF BREATH: N
STOMACH PAIN: N
BLACK STOOLS: N
BLOOD IN STOOLS: N
HEARTBURN OR REFLUX: N
DRYNESS OF MOUTH: N
DIFFICULTY FALLING ASLEEP: Y
AGITATION: N
MORNING STIFFNESS IN LOWER BACK: Y
MORNING STIFFNESS: Y
HOW WOULD YOU DESCRIBE YOUR STIFFNESS ON AVERAGE: MODERATE
SEIZURES: N
SWOLLEN OR TENDER GLANDS: N
NIGHT SWEATS: Y
VOMITING OF BLOOD OR COFFEE GROUND CONSISTENCY MATERIAL: N
HEADACHES: N
SORES IN MOUTH OR NOSE: N
ANEMIA: N
SWOLLEN LEGS OR FEET: N
EASY BRUISING: N
EXCESSIVE HAIR LOSS (MORE THAN YOUR NORM): N
UNUSUAL BLEEDING: N

## 2025-08-20 ENCOUNTER — HOSPITAL ENCOUNTER (OUTPATIENT)
Dept: RADIOLOGY | Facility: CLINIC | Age: 60
Discharge: HOME | End: 2025-08-20
Payer: COMMERCIAL

## 2025-08-20 ENCOUNTER — LAB (OUTPATIENT)
Dept: LAB | Facility: HOSPITAL | Age: 60
End: 2025-08-20
Payer: COMMERCIAL

## 2025-08-20 DIAGNOSIS — M81.0 AGE-RELATED OSTEOPOROSIS WITHOUT CURRENT PATHOLOGICAL FRACTURE: Primary | ICD-10-CM

## 2025-08-20 DIAGNOSIS — M45.9 ANKYLOSING SPONDYLITIS, UNSPECIFIED SITE OF SPINE (MULTI): ICD-10-CM

## 2025-08-20 DIAGNOSIS — M46.1 BILATERAL SACROILIITIS: ICD-10-CM

## 2025-08-20 DIAGNOSIS — M81.0 OSTEOPOROSIS WITHOUT CURRENT PATHOLOGICAL FRACTURE, UNSPECIFIED OSTEOPOROSIS TYPE: ICD-10-CM

## 2025-08-20 LAB — PROT SERPL-MCNC: 7.4 G/DL (ref 6.4–8.2)

## 2025-08-20 PROCEDURE — 72114 X-RAY EXAM L-S SPINE BENDING: CPT | Performed by: RADIOLOGY

## 2025-08-20 PROCEDURE — 36415 COLL VENOUS BLD VENIPUNCTURE: CPT

## 2025-08-20 PROCEDURE — 84155 ASSAY OF PROTEIN SERUM: CPT

## 2025-08-20 PROCEDURE — 84165 PROTEIN E-PHORESIS SERUM: CPT

## 2025-08-20 PROCEDURE — 72114 X-RAY EXAM L-S SPINE BENDING: CPT

## 2025-08-23 LAB
25(OH)D3+25(OH)D2 SERPL-MCNC: 83 NG/ML (ref 30–100)
ALBUMIN SERPL-MCNC: 4.8 G/DL (ref 3.6–5.1)
ALP SERPL-CCNC: 52 U/L (ref 37–153)
ALT SERPL-CCNC: 15 U/L (ref 6–29)
ANION GAP SERPL CALCULATED.4IONS-SCNC: 11 MMOL/L (CALC) (ref 7–17)
AST SERPL-CCNC: 18 U/L (ref 10–35)
BILIRUB SERPL-MCNC: 0.5 MG/DL (ref 0.2–1.2)
BUN SERPL-MCNC: 26 MG/DL (ref 7–25)
CALCIUM 24H UR-MRATE: NORMAL MG/(24.H)
CALCIUM SERPL-MCNC: 9.6 MG/DL (ref 8.6–10.4)
CALCIUM/CREAT 24H UR: NORMAL MG/G{CREAT}
CHLORIDE SERPL-SCNC: 104 MMOL/L (ref 98–110)
CO2 SERPL-SCNC: 25 MMOL/L (ref 20–32)
COLLAGEN CTX SERPL-MCNC: 620 PG/ML
CREAT 24H UR-MRATE: 0.99 G/24 H (ref 0.5–2.15)
CREAT 24H UR-MRATE: NORMAL G/(24.H)
CREAT SERPL-MCNC: 0.73 MG/DL (ref 0.5–1.03)
EGFRCR SERPLBLD CKD-EPI 2021: 95 ML/MIN/1.73M2
GLUCOSE SERPL-MCNC: 100 MG/DL (ref 65–99)
HLA-B27 QL NAA+PROBE: NORMAL
POTASSIUM SERPL-SCNC: 4.1 MMOL/L (ref 3.5–5.3)
PROT SERPL-MCNC: 7.2 G/DL (ref 6.1–8.1)
PTH-INTACT SERPL-MCNC: 31 PG/ML (ref 16–77)
QUEST HLAB27 TYPING RESULTS REVIEWED BY:: NORMAL
SODIUM SERPL-SCNC: 140 MMOL/L (ref 135–146)
SPECIMEN VOL 24H UR: NORMAL L

## 2025-08-24 LAB
ALBUMIN: 4.8 G/DL (ref 3.4–5)
ALPHA 1 GLOBULIN: 0.2 G/DL (ref 0.2–0.6)
ALPHA 2 GLOBULIN: 0.6 G/DL (ref 0.4–1.1)
BETA GLOBULIN: 0.9 G/DL (ref 0.5–1.2)
GAMMA GLOBULIN: 0.9 G/DL (ref 0.5–1.4)
PATH REVIEW-SERUM PROTEIN ELECTROPHORESIS: NORMAL
PROTEIN ELECTROPHORESIS COMMENT: NORMAL

## 2025-08-25 LAB
25(OH)D3+25(OH)D2 SERPL-MCNC: 83 NG/ML (ref 30–100)
ALBUMIN SERPL-MCNC: 4.8 G/DL (ref 3.6–5.1)
ALP SERPL-CCNC: 52 U/L (ref 37–153)
ALT SERPL-CCNC: 15 U/L (ref 6–29)
ANION GAP SERPL CALCULATED.4IONS-SCNC: 11 MMOL/L (CALC) (ref 7–17)
AST SERPL-CCNC: 18 U/L (ref 10–35)
BILIRUB SERPL-MCNC: 0.5 MG/DL (ref 0.2–1.2)
BUN SERPL-MCNC: 26 MG/DL (ref 7–25)
CALCIUM SERPL-MCNC: 9.6 MG/DL (ref 8.6–10.4)
CHLORIDE SERPL-SCNC: 104 MMOL/L (ref 98–110)
CO2 SERPL-SCNC: 25 MMOL/L (ref 20–32)
COLLAGEN CTX SERPL-MCNC: 620 PG/ML
CREAT SERPL-MCNC: 0.73 MG/DL (ref 0.5–1.03)
EGFRCR SERPLBLD CKD-EPI 2021: 95 ML/MIN/1.73M2
GLUCOSE SERPL-MCNC: 100 MG/DL (ref 65–99)
HLA-B27 QL NAA+PROBE: NEGATIVE
POTASSIUM SERPL-SCNC: 4.1 MMOL/L (ref 3.5–5.3)
PROT SERPL-MCNC: 7.2 G/DL (ref 6.1–8.1)
PTH-INTACT SERPL-MCNC: 31 PG/ML (ref 16–77)
QUEST HLAB27 TYPING RESULTS REVIEWED BY:: NORMAL
SODIUM SERPL-SCNC: 140 MMOL/L (ref 135–146)

## 2025-08-26 LAB
CALCIUM 24H UR-MRATE: 192 MG/24 H (ref 35–250)
CALCIUM/CREAT 24H UR: 193 MG/G CREAT (ref 30–275)
CREAT 24H UR-MRATE: 0.99 G/24 H (ref 0.5–2.15)
CREAT 24H UR-MRATE: 1 G/24 H (ref 0.5–2.15)
SPECIMEN VOL 24H UR: 1700 ML

## 2025-09-09 ENCOUNTER — APPOINTMENT (OUTPATIENT)
Dept: RADIOLOGY | Facility: HOSPITAL | Age: 60
End: 2025-09-09
Payer: COMMERCIAL

## 2025-09-16 ENCOUNTER — APPOINTMENT (OUTPATIENT)
Dept: OBSTETRICS AND GYNECOLOGY | Facility: CLINIC | Age: 60
End: 2025-09-16
Payer: COMMERCIAL

## 2025-10-08 ENCOUNTER — APPOINTMENT (OUTPATIENT)
Dept: RHEUMATOLOGY | Facility: CLINIC | Age: 60
End: 2025-10-08
Payer: COMMERCIAL

## 2026-06-10 ENCOUNTER — APPOINTMENT (OUTPATIENT)
Dept: DERMATOLOGY | Facility: CLINIC | Age: 61
End: 2026-06-10
Payer: COMMERCIAL